# Patient Record
Sex: MALE | Race: BLACK OR AFRICAN AMERICAN | NOT HISPANIC OR LATINO | Employment: OTHER | URBAN - METROPOLITAN AREA
[De-identification: names, ages, dates, MRNs, and addresses within clinical notes are randomized per-mention and may not be internally consistent; named-entity substitution may affect disease eponyms.]

---

## 2023-10-29 ENCOUNTER — OFFICE VISIT (OUTPATIENT)
Dept: URGENT CARE | Facility: CLINIC | Age: 88
End: 2023-10-29
Payer: MEDICARE

## 2023-10-29 VITALS
HEART RATE: 44 BPM | OXYGEN SATURATION: 97 % | RESPIRATION RATE: 18 BRPM | DIASTOLIC BLOOD PRESSURE: 70 MMHG | SYSTOLIC BLOOD PRESSURE: 137 MMHG | TEMPERATURE: 97.8 F

## 2023-10-29 DIAGNOSIS — H61.22 IMPACTED CERUMEN OF LEFT EAR: Primary | ICD-10-CM

## 2023-10-29 PROCEDURE — 99203 OFFICE O/P NEW LOW 30 MIN: CPT | Performed by: PHYSICIAN ASSISTANT

## 2023-10-29 NOTE — PROGRESS NOTES
Subjective   Patient ID: Elliott Driscoll is a 89 y.o. male who presents for Cerumen Impaction.  Patient notes ear blockage to the left ear over the course of the last several days.  If he is having a difficult time hearing.  Notes that he has problems with cerumen impactions in the past.  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        Review of Systems   All other systems reviewed and are negative.      Objective   /70   Pulse (!) 44   Temp 36.6 °C (97.8 °F)   Resp 18   SpO2 97%   Physical Exam  Constitutional:       General: He is not in acute distress.     Appearance: Normal appearance. He is not ill-appearing.   HENT:      Right Ear: There is no impacted cerumen.      Left Ear: There is impacted cerumen.      Ears:      Comments: Left-sided EAC occluded with cerumen.  After irrigation by nursing staff bilateral EACs with mild amount of cerumen still retained the TM is visualized and the patient's hearing is greatly increased after the irrigation by nursing staff     Nose: No congestion or rhinorrhea.   Cardiovascular:      Rate and Rhythm: Normal rate.   Pulmonary:      Effort: Pulmonary effort is normal.   Neurological:      Mental Status: He is alert.         Assessment/Plan   Problem List Items Addressed This Visit       Impacted cerumen of left ear - Primary      I personally reinspected the bilateral ears after irrigation by the nursing staff.  The cerumen impaction is resolved he still has some retained cerumen bilaterally but he is noting significant improvement in his hearing.